# Patient Record
Sex: FEMALE | URBAN - METROPOLITAN AREA
[De-identification: names, ages, dates, MRNs, and addresses within clinical notes are randomized per-mention and may not be internally consistent; named-entity substitution may affect disease eponyms.]

---

## 2020-06-19 ENCOUNTER — APPOINTMENT (OUTPATIENT)
Age: 60
Setting detail: DERMATOLOGY
End: 2020-06-28

## 2020-06-19 VITALS — TEMPERATURE: 98.6 F

## 2020-06-19 DIAGNOSIS — Z41.9 ENCOUNTER FOR PROCEDURE FOR PURPOSES OTHER THAN REMEDYING HEALTH STATE, UNSPECIFIED: ICD-10-CM

## 2020-06-19 PROCEDURE — OTHER ADDITIONAL NOTES: OTHER

## 2020-06-19 NOTE — PROCEDURE: ADDITIONAL NOTES
Additional Notes: Rta 06/26/2020 for Restylane kycharlieanna,  purchased alto serum defense.
Detail Level: Simple

## 2020-07-24 ENCOUNTER — APPOINTMENT (OUTPATIENT)
Age: 60
Setting detail: DERMATOLOGY
End: 2020-08-16

## 2020-07-24 VITALS — TEMPERATURE: 97.7 F

## 2020-07-24 DIAGNOSIS — L90.8 OTHER ATROPHIC DISORDERS OF SKIN: ICD-10-CM

## 2020-07-24 PROCEDURE — OTHER RESTYLANE INJECTION: OTHER
